# Patient Record
Sex: FEMALE | Race: WHITE | Employment: FULL TIME | URBAN - METROPOLITAN AREA
[De-identification: names, ages, dates, MRNs, and addresses within clinical notes are randomized per-mention and may not be internally consistent; named-entity substitution may affect disease eponyms.]

---

## 2024-04-30 NOTE — PROGRESS NOTES
does not appear to be an indication for valve surgery at this time.  - Continue aerobic activity as tolerated  - Her heart rate is mildly elevated today but has been controlled at her cardiology office visits.  Should this become consistently elevated may need beta-blocker.  - at this time serial echocardiogram would be recommended unless the patient manifest symptoms of decompensated heart failure, such as leg swelling, shortness of breath, orthopnea.  Should this occur she would need to be evaluated immediately.  -  - Discussed indications for valve surgery with her including severe symptomatic aortic insufficiency with dilated left ventricle and decreased LVEF.  At this time she does not meet any of these criteria.    - EKG personally reviewed in office today demonstrates Sinus Tachycardia   102 BPM. Nonspecific ST abnormalities. ABNORMAL.     Questions answered with the patient today.    Discussed the nature of her valve disease with the patient today, at this time reasonable to continue following with her primary cardiologist at [facility] Dr. Carlos Euceda. She voiced understanding.     Follow up with us on as needed basis.       Problem List Items Addressed This Visit    None  Visit Diagnoses       Moderate aortic insufficiency    -  Primary    Relevant Medications    furosemide (LASIX) 20 MG tablet    Other Relevant Orders    EKG 12 Lead - Clinic Performed (Completed)              Instructed patient go to ER or call 911/EMS should symptoms recur or worsen.     Patient has been instructed and agrees to call our office with any issues or other concerns related to their cardiac condition(s) and/or complaint(s).    No follow-ups on file.    Timoteo Bynum DO  5/1/2024 4:39 PM

## 2024-05-01 ENCOUNTER — OFFICE VISIT (OUTPATIENT)
Age: 49
End: 2024-05-01
Payer: COMMERCIAL

## 2024-05-01 VITALS
SYSTOLIC BLOOD PRESSURE: 128 MMHG | WEIGHT: 138 LBS | HEIGHT: 59 IN | DIASTOLIC BLOOD PRESSURE: 72 MMHG | BODY MASS INDEX: 27.82 KG/M2 | HEART RATE: 92 BPM

## 2024-05-01 DIAGNOSIS — I35.1 MODERATE AORTIC INSUFFICIENCY: Primary | ICD-10-CM

## 2024-05-01 PROCEDURE — 93000 ELECTROCARDIOGRAM COMPLETE: CPT | Performed by: INTERNAL MEDICINE

## 2024-05-01 PROCEDURE — 99204 OFFICE O/P NEW MOD 45 MIN: CPT | Performed by: INTERNAL MEDICINE

## 2024-05-01 RX ORDER — MULTIVITAMIN WITH IRON
250 TABLET ORAL DAILY
COMMUNITY

## 2024-05-01 RX ORDER — POTASSIUM CHLORIDE 750 MG/1
10 TABLET, FILM COATED, EXTENDED RELEASE ORAL DAILY PRN
COMMUNITY
Start: 2024-02-23

## 2024-05-01 RX ORDER — CITALOPRAM 20 MG/1
20 TABLET ORAL DAILY
COMMUNITY
Start: 2024-04-10 | End: 2025-04-10

## 2024-05-01 RX ORDER — FUROSEMIDE 20 MG/1
20 TABLET ORAL DAILY PRN
COMMUNITY
Start: 2024-02-23 | End: 2025-02-22

## 2024-05-01 RX ORDER — OMEPRAZOLE 40 MG/1
40 CAPSULE, DELAYED RELEASE ORAL PRN
COMMUNITY
Start: 2022-04-01

## 2024-05-01 RX ORDER — ASCORBIC ACID 500 MG
500 TABLET ORAL DAILY
COMMUNITY

## 2024-05-01 ASSESSMENT — ENCOUNTER SYMPTOMS
ORTHOPNEA: 0
EYES NEGATIVE: 1
RESPIRATORY NEGATIVE: 1
ALLERGIC/IMMUNOLOGIC NEGATIVE: 1
GASTROINTESTINAL NEGATIVE: 1

## 2024-06-06 ENCOUNTER — TRANSCRIBE ORDERS (OUTPATIENT)
Dept: SCHEDULING | Age: 49
End: 2024-06-06

## 2024-06-06 DIAGNOSIS — M25.532 LEFT WRIST PAIN: Primary | ICD-10-CM
